# Patient Record
Sex: FEMALE | Race: WHITE | NOT HISPANIC OR LATINO | Employment: OTHER | URBAN - METROPOLITAN AREA
[De-identification: names, ages, dates, MRNs, and addresses within clinical notes are randomized per-mention and may not be internally consistent; named-entity substitution may affect disease eponyms.]

---

## 2020-03-25 ENCOUNTER — OFFICE VISIT (OUTPATIENT)
Dept: FAMILY MEDICINE | Facility: CLINIC | Age: 80
End: 2020-03-25
Payer: MEDICARE

## 2020-03-25 VITALS
HEART RATE: 77 BPM | SYSTOLIC BLOOD PRESSURE: 140 MMHG | TEMPERATURE: 97 F | BODY MASS INDEX: 19.71 KG/M2 | WEIGHT: 100.38 LBS | HEIGHT: 60 IN | DIASTOLIC BLOOD PRESSURE: 69 MMHG | OXYGEN SATURATION: 95 % | RESPIRATION RATE: 16 BRPM

## 2020-03-25 DIAGNOSIS — G47.00 INSOMNIA, UNSPECIFIED TYPE: Chronic | ICD-10-CM

## 2020-03-25 DIAGNOSIS — I10 HTN (HYPERTENSION), BENIGN: ICD-10-CM

## 2020-03-25 DIAGNOSIS — M19.90 ARTHRITIS: Chronic | ICD-10-CM

## 2020-03-25 DIAGNOSIS — E53.8 B12 DEFICIENCY: ICD-10-CM

## 2020-03-25 DIAGNOSIS — Z76.89 ENCOUNTER TO ESTABLISH CARE: Primary | ICD-10-CM

## 2020-03-25 DIAGNOSIS — M62.838 MUSCLE SPASM: Chronic | ICD-10-CM

## 2020-03-25 DIAGNOSIS — E78.5 HYPERLIPIDEMIA, UNSPECIFIED HYPERLIPIDEMIA TYPE: ICD-10-CM

## 2020-03-25 DIAGNOSIS — E03.9 HYPOTHYROIDISM, UNSPECIFIED TYPE: ICD-10-CM

## 2020-03-25 DIAGNOSIS — R52 PAIN MANAGEMENT: Chronic | ICD-10-CM

## 2020-03-25 DIAGNOSIS — E11.9 TYPE 2 DIABETES MELLITUS WITHOUT COMPLICATION, WITHOUT LONG-TERM CURRENT USE OF INSULIN: ICD-10-CM

## 2020-03-25 DIAGNOSIS — I34.1 MITRAL VALVE PROLAPSE: Chronic | ICD-10-CM

## 2020-03-25 DIAGNOSIS — E55.9 VITAMIN D DEFICIENCY: ICD-10-CM

## 2020-03-25 PROCEDURE — 3078F PR MOST RECENT DIASTOLIC BLOOD PRESSURE < 80 MM HG: ICD-10-PCS | Mod: CPTII,S$GLB,, | Performed by: FAMILY MEDICINE

## 2020-03-25 PROCEDURE — 99204 OFFICE O/P NEW MOD 45 MIN: CPT | Mod: S$GLB,,, | Performed by: FAMILY MEDICINE

## 2020-03-25 PROCEDURE — 1159F PR MEDICATION LIST DOCUMENTED IN MEDICAL RECORD: ICD-10-PCS | Mod: S$GLB,,, | Performed by: FAMILY MEDICINE

## 2020-03-25 PROCEDURE — 3077F SYST BP >= 140 MM HG: CPT | Mod: CPTII,S$GLB,, | Performed by: FAMILY MEDICINE

## 2020-03-25 PROCEDURE — 99204 PR OFFICE/OUTPT VISIT, NEW, LEVL IV, 45-59 MIN: ICD-10-PCS | Mod: S$GLB,,, | Performed by: FAMILY MEDICINE

## 2020-03-25 PROCEDURE — 3077F PR MOST RECENT SYSTOLIC BLOOD PRESSURE >= 140 MM HG: ICD-10-PCS | Mod: CPTII,S$GLB,, | Performed by: FAMILY MEDICINE

## 2020-03-25 PROCEDURE — 1159F MED LIST DOCD IN RCRD: CPT | Mod: S$GLB,,, | Performed by: FAMILY MEDICINE

## 2020-03-25 PROCEDURE — 3078F DIAST BP <80 MM HG: CPT | Mod: CPTII,S$GLB,, | Performed by: FAMILY MEDICINE

## 2020-03-25 RX ORDER — ATENOLOL 100 MG/1
1 TABLET ORAL DAILY
COMMUNITY
Start: 2020-01-28 | End: 2021-02-08 | Stop reason: SDUPTHER

## 2020-03-25 RX ORDER — AMITRIPTYLINE HYDROCHLORIDE 50 MG/1
1 TABLET, FILM COATED ORAL DAILY
COMMUNITY
Start: 2020-02-13 | End: 2020-03-25 | Stop reason: SDUPTHER

## 2020-03-25 RX ORDER — METHOTREXATE 2.5 MG/1
6 TABLET ORAL
COMMUNITY
Start: 2020-02-05 | End: 2022-12-20

## 2020-03-25 RX ORDER — GABAPENTIN 300 MG/1
300 CAPSULE ORAL 3 TIMES DAILY
Qty: 270 CAPSULE | Refills: 1 | Status: SHIPPED | OUTPATIENT
Start: 2020-03-25 | End: 2020-07-16 | Stop reason: SDUPTHER

## 2020-03-25 RX ORDER — GABAPENTIN 300 MG/1
1 CAPSULE ORAL 3 TIMES DAILY
COMMUNITY
Start: 2020-02-13 | End: 2020-03-25 | Stop reason: SDUPTHER

## 2020-03-25 RX ORDER — TIZANIDINE 4 MG/1
1 TABLET ORAL 2 TIMES DAILY
COMMUNITY
Start: 2020-03-12 | End: 2020-03-25 | Stop reason: SDUPTHER

## 2020-03-25 RX ORDER — TRAMADOL HYDROCHLORIDE 50 MG/1
2 TABLET ORAL DAILY
COMMUNITY
Start: 2020-02-13 | End: 2021-02-10

## 2020-03-25 RX ORDER — BUPRENORPHINE HYDROCHLORIDE 8 MG/1
1 TABLET SUBLINGUAL 2 TIMES DAILY
COMMUNITY
Start: 2020-02-13 | End: 2022-12-20

## 2020-03-25 RX ORDER — HYDROCODONE BITARTRATE AND ACETAMINOPHEN 7.5; 325 MG/1; MG/1
1 TABLET ORAL DAILY
COMMUNITY
Start: 2020-03-12 | End: 2021-02-10

## 2020-03-25 RX ORDER — TIZANIDINE 4 MG/1
4 TABLET ORAL 2 TIMES DAILY
Qty: 180 TABLET | Refills: 1 | Status: SHIPPED | OUTPATIENT
Start: 2020-03-25 | End: 2020-07-16 | Stop reason: SDUPTHER

## 2020-03-25 RX ORDER — AMITRIPTYLINE HYDROCHLORIDE 50 MG/1
100 TABLET, FILM COATED ORAL NIGHTLY PRN
Qty: 90 TABLET | Refills: 3 | Status: SHIPPED | OUTPATIENT
Start: 2020-03-25 | End: 2020-06-22 | Stop reason: SDUPTHER

## 2020-03-25 NOTE — PROGRESS NOTES
Subjective:       Patient ID: Meagan Salcedo is a 79 y.o. female.    Chief Complaint: Establish Care (pt is here to get established with a pcp.)    78 yo F here to get established. Previously seen by Dr Martins a long time ago. She has been seen by rheumatology and pain management. Pt has had 6 surgeries. pMHx of some arthritis that she sees Dr Cason for and takes methotrexate. Pt also has insomnia for which she takes amitriptylin and she really would like the 100 mg dosage. She also has neuropathy for which she takes gabapentin 300mg TiD. And she is on a muscle relaxant for muscle spasms that are caused by her surgeries.   Discussed the adverse effects of her medications and pt agrees that she will be very careful     Review of Systems   Constitutional: Negative for activity change, chills, fatigue, fever and unexpected weight change.   HENT: Negative for ear pain, rhinorrhea and trouble swallowing.    Eyes: Negative for pain.   Respiratory: Negative for cough, chest tightness, shortness of breath and wheezing.    Cardiovascular: Negative for chest pain and palpitations.   Gastrointestinal: Negative for abdominal distention, abdominal pain, constipation, diarrhea, nausea and vomiting.   Endocrine: Negative for cold intolerance and heat intolerance.   Genitourinary: Negative for dysuria, frequency and urgency.   Musculoskeletal: Positive for arthralgias. Negative for myalgias.   Skin: Negative for rash.   Neurological: Negative for dizziness, syncope, light-headedness and headaches.   Hematological: Does not bruise/bleed easily.   Psychiatric/Behavioral: Negative for agitation and confusion.       Objective:      Physical Exam   Constitutional: She appears well-developed.   HENT:   Right Ear: External ear normal.   Left Ear: External ear normal.   Mouth/Throat: Oropharynx is clear and moist.   Eyes: Conjunctivae and EOM are normal.   Neck: Normal range of motion.   Cardiovascular: Normal rate, regular rhythm  and intact distal pulses.   Pulmonary/Chest: Effort normal and breath sounds normal.   Abdominal: Soft.   Musculoskeletal: Normal range of motion.   Neurological: She is alert.   Skin: Skin is warm. Capillary refill takes less than 2 seconds.   Psychiatric: She has a normal mood and affect.   Nursing note and vitals reviewed.      Assessment:       1. Encounter to establish care    2. Pain management - 6 surgeries    3. Arthritis - on methotrexate    4. Mitral valve prolapse - Dr Shea    5. Insomnia, unspecified type Stable   6. Muscle spasm Stable   7. HTN (hypertension), benign    8. Hyperlipidemia, unspecified hyperlipidemia type    9. Hypothyroidism, unspecified type    10. Vitamin D deficiency    11. Type 2 diabetes mellitus without complication, without long-term current use of insulin    12. B12 deficiency        Plan:       PROBLEM LIST     Meagan was seen today for establish care.    Diagnoses and all orders for this visit:    Encounter to establish care    Pain management - 6 surgeries  Comments:  Dr URBAN Valencia  Orders:  -     tiZANidine (ZANAFLEX) 4 MG tablet; Take 1 tablet (4 mg total) by mouth 2 (two) times daily.  -     gabapentin (NEURONTIN) 300 MG capsule; Take 1 capsule (300 mg total) by mouth 3 (three) times daily.    Arthritis - on methotrexate  Comments:  dr Cason - as per pt it is osteoarthritis    Mitral valve prolapse - Dr Shea  Comments:  on atenolol    Insomnia, unspecified type  Comments:  on amitriptyline - refilled  Orders:  -     amitriptyline (ELAVIL) 50 MG tablet; Take 2 tablets (100 mg total) by mouth nightly as needed for Insomnia.    Muscle spasm  Comments:  controlled on tizanidine  Orders:  -     tiZANidine (ZANAFLEX) 4 MG tablet; Take 1 tablet (4 mg total) by mouth 2 (two) times daily.    HTN (hypertension), benign  -     CBC auto differential; Future  -     Comprehensive metabolic panel; Future  -     CBC auto differential  -     Comprehensive metabolic  panel    Hyperlipidemia, unspecified hyperlipidemia type  -     Lipid panel; Future  -     Lipid panel    Hypothyroidism, unspecified type  -     TSH; Future  -     TSH    Vitamin D deficiency  -     Vitamin D; Future  -     Vitamin D    Type 2 diabetes mellitus without complication, without long-term current use of insulin  -     Hemoglobin A1c; Future  -     Hemoglobin A1c    B12 deficiency  -     Vitamin B12; Future  -     Vitamin B12

## 2020-03-26 PROBLEM — G47.00 INSOMNIA: Chronic | Status: ACTIVE | Noted: 2020-03-26

## 2020-03-26 PROBLEM — M62.838 MUSCLE SPASM: Chronic | Status: ACTIVE | Noted: 2020-03-26

## 2020-04-01 LAB
ABS NRBC COUNT: 0 X 10 3/UL (ref 0–0.01)
ABSOLUTE BASOPHIL: 0.05 X 10 3/UL (ref 0–0.22)
ABSOLUTE EOSINOPHIL: 0.22 X 10 3/UL (ref 0.04–0.54)
ABSOLUTE IMMATURE GRAN: 0.01 X 10 3/UL (ref 0–0.04)
ABSOLUTE LYMPHOCYTE: 2.16 X 10 3/UL (ref 0.86–4.75)
ABSOLUTE MONOCYTE: 0.58 X 10 3/UL (ref 0.22–1.08)
ALBUMIN SERPL-MCNC: 4.1 G/DL (ref 3.5–5.2)
ALBUMIN/GLOB SERPL ELPH: 1.6 {RATIO} (ref 1–2.7)
ALP ISOS SERPL LEV INH-CCNC: 178 U/L (ref 35–105)
ALT (SGPT): 23 U/L (ref 0–33)
ANION GAP SERPL CALC-SCNC: 13 MMOL/L (ref 8–17)
AST SERPL-CCNC: 24 U/L (ref 0–32)
B12: 323 PG/ML (ref 232–1245)
BASOPHILS NFR BLD: 0.9 % (ref 0.2–1.2)
BILIRUBIN, TOTAL: <0.15 MG/DL (ref 0–1.2)
BUN/CREAT SERPL: 25.5 (ref 6–20)
CALCIUM SERPL-MCNC: 9 MG/DL (ref 8.6–10.2)
CARBON DIOXIDE, CO2: 26 MMOL/L (ref 22–29)
CHLORIDE: 102 MMOL/L (ref 98–107)
CHOLEST SERPL-MSCNC: 259 MG/DL (ref 100–200)
CREAT SERPL-MCNC: 0.6 MG/DL (ref 0.5–0.9)
EOSINOPHIL NFR BLD: 3.8 % (ref 0.7–7)
ESTIMATED AVERAGE GLUCOSE: 134 MG/DL
GFR ESTIMATION: 96.94
GLOBULIN: 2.6 G/DL (ref 1.5–4.5)
GLUCOSE: 120 MG/DL (ref 82–115)
HBA1C MFR BLD: 6.3 % (ref 4–6)
HCT VFR BLD AUTO: 38.6 % (ref 37–47)
HDLC SERPL-MCNC: 73 MG/DL
HGB BLD-MCNC: 12.3 G/DL (ref 12–16)
IMMATURE GRANULOCYTES: 0.2 % (ref 0–0.5)
LDL/HDL RATIO: 2.1 (ref 1–3)
LDLC SERPL CALC-MCNC: 156 MG/DL (ref 0–100)
LYMPHOCYTES NFR BLD: 37.3 % (ref 19.3–53.1)
MCH RBC QN AUTO: 28.8 PG (ref 27–32)
MCHC RBC AUTO-ENTMCNC: 31.9 G/DL (ref 32–36)
MCV RBC AUTO: 90.4 FL (ref 82–100)
MONOCYTES NFR BLD: 10 % (ref 4.7–12.5)
NEUTROPHILS ABSOLUTE COUNT: 2.77 X 10 3/UL (ref 2.15–7.56)
NEUTROPHILS NFR BLD: 47.8 %
NUCLEATED RED BLOOD CELLS: 0 /100 WBC (ref 0–0.2)
PLATELET # BLD AUTO: 415 X 10 3/UL (ref 135–400)
POTASSIUM: 4.2 MMOL/L (ref 3.5–5.1)
PROT SNV-MCNC: 6.7 G/DL (ref 6.4–8.3)
RBC # BLD AUTO: 4.27 X 10 6/UL (ref 4.2–5.4)
RDW-SD: 44.8 FL (ref 37–54)
SODIUM: 141 MMOL/L (ref 136–145)
TRIGL SERPL-MCNC: 150 MG/DL (ref 0–150)
TSH SERPL DL<=0.005 MIU/L-ACNC: 1.07 UIU/ML (ref 0.27–4.2)
UREA NITROGEN (BUN): 15.3 MG/DL (ref 8–23)
VITAMIN D (25OHD): 75.3 NG/ML
WBC # BLD: 5.79 X 10 3/UL (ref 4.3–10.8)

## 2020-04-14 ENCOUNTER — PATIENT MESSAGE (OUTPATIENT)
Dept: FAMILY MEDICINE | Facility: CLINIC | Age: 80
End: 2020-04-14

## 2020-06-22 DIAGNOSIS — G47.00 INSOMNIA, UNSPECIFIED TYPE: Chronic | ICD-10-CM

## 2020-06-22 DIAGNOSIS — M62.838 MUSCLE SPASM: Chronic | ICD-10-CM

## 2020-06-22 DIAGNOSIS — R52 PAIN MANAGEMENT: Chronic | ICD-10-CM

## 2020-06-22 NOTE — TELEPHONE ENCOUNTER
----- Message from Clair He sent at 6/22/2020 11:26 AM CDT -----  Pt want to receive a call to discuss medication she didn't know that she had to be seen in order to get a refill. She is completely out of medication an appt was schedule out for 07/16 pt need to know what should she do now. [phone]       Thanks   [phone]

## 2020-06-22 NOTE — TELEPHONE ENCOUNTER
She has pain management  (Dr Valencia) and I filled as a courtesy.... when will she see him again?

## 2020-06-22 NOTE — TELEPHONE ENCOUNTER
Pt has made a apt. For 7/16. Would you be able to fill these meds till her apt she is completely out?

## 2020-06-25 ENCOUNTER — TELEPHONE (OUTPATIENT)
Dept: INTERNAL MEDICINE | Facility: CLINIC | Age: 80
End: 2020-06-25

## 2020-06-25 NOTE — TELEPHONE ENCOUNTER
----- Message from Bridgette Contreras sent at 6/25/2020  4:15 PM CDT -----  Regarding: Patient Advice  Contact: patient  Would like to consult with nurse regarding her blood work results. Patient is stating when she viewed her results online it stated she had diabetes and she is needing to discuss the results. Please call back at [phone]    
----- Message from Bridgette Contreras sent at 6/25/2020  4:15 PM CDT -----  Regarding: Patient Advice  Contact: patient  Would like to consult with nurse regarding her blood work results. Patient is stating when she viewed her results online it stated she had diabetes and she is needing to discuss the results. Please call back at [phone]    
I called pt and let her know that she will have to come in to her apt to discuss her lab work results and she said ok.   
facial swelling

## 2020-07-01 RX ORDER — GABAPENTIN 300 MG/1
300 CAPSULE ORAL 3 TIMES DAILY
Qty: 90 CAPSULE | Refills: 0 | OUTPATIENT
Start: 2020-07-01

## 2020-07-01 RX ORDER — TIZANIDINE 4 MG/1
4 TABLET ORAL 2 TIMES DAILY
Qty: 60 TABLET | Refills: 0 | OUTPATIENT
Start: 2020-07-01

## 2020-07-01 RX ORDER — AMITRIPTYLINE HYDROCHLORIDE 50 MG/1
100 TABLET, FILM COATED ORAL NIGHTLY PRN
Qty: 180 TABLET | Refills: 1 | Status: SHIPPED | OUTPATIENT
Start: 2020-07-01 | End: 2021-02-08 | Stop reason: SDUPTHER

## 2020-07-16 ENCOUNTER — OFFICE VISIT (OUTPATIENT)
Dept: FAMILY MEDICINE | Facility: CLINIC | Age: 80
End: 2020-07-16
Payer: MEDICARE

## 2020-07-16 VITALS
HEIGHT: 60 IN | TEMPERATURE: 98 F | OXYGEN SATURATION: 94 % | SYSTOLIC BLOOD PRESSURE: 137 MMHG | BODY MASS INDEX: 20.94 KG/M2 | WEIGHT: 106.63 LBS | HEART RATE: 71 BPM | RESPIRATION RATE: 16 BRPM | DIASTOLIC BLOOD PRESSURE: 67 MMHG

## 2020-07-16 DIAGNOSIS — R52 PAIN MANAGEMENT: Chronic | ICD-10-CM

## 2020-07-16 DIAGNOSIS — M62.838 MUSCLE SPASM: Chronic | ICD-10-CM

## 2020-07-16 DIAGNOSIS — G47.00 INSOMNIA, UNSPECIFIED TYPE: Chronic | ICD-10-CM

## 2020-07-16 DIAGNOSIS — M19.90 ARTHRITIS: Primary | Chronic | ICD-10-CM

## 2020-07-16 DIAGNOSIS — E78.5 HYPERLIPIDEMIA, UNSPECIFIED HYPERLIPIDEMIA TYPE: Chronic | ICD-10-CM

## 2020-07-16 PROCEDURE — 3078F DIAST BP <80 MM HG: CPT | Mod: CPTII,S$GLB,, | Performed by: FAMILY MEDICINE

## 2020-07-16 PROCEDURE — 3075F PR MOST RECENT SYSTOLIC BLOOD PRESS GE 130-139MM HG: ICD-10-PCS | Mod: CPTII,S$GLB,, | Performed by: FAMILY MEDICINE

## 2020-07-16 PROCEDURE — 99214 PR OFFICE/OUTPT VISIT, EST, LEVL IV, 30-39 MIN: ICD-10-PCS | Mod: S$GLB,,, | Performed by: FAMILY MEDICINE

## 2020-07-16 PROCEDURE — 99214 OFFICE O/P EST MOD 30 MIN: CPT | Mod: S$GLB,,, | Performed by: FAMILY MEDICINE

## 2020-07-16 PROCEDURE — 1159F PR MEDICATION LIST DOCUMENTED IN MEDICAL RECORD: ICD-10-PCS | Mod: S$GLB,,, | Performed by: FAMILY MEDICINE

## 2020-07-16 PROCEDURE — 3075F SYST BP GE 130 - 139MM HG: CPT | Mod: CPTII,S$GLB,, | Performed by: FAMILY MEDICINE

## 2020-07-16 PROCEDURE — 1159F MED LIST DOCD IN RCRD: CPT | Mod: S$GLB,,, | Performed by: FAMILY MEDICINE

## 2020-07-16 PROCEDURE — 3078F PR MOST RECENT DIASTOLIC BLOOD PRESSURE < 80 MM HG: ICD-10-PCS | Mod: CPTII,S$GLB,, | Performed by: FAMILY MEDICINE

## 2020-07-16 RX ORDER — GABAPENTIN 300 MG/1
300 CAPSULE ORAL 3 TIMES DAILY
Qty: 270 CAPSULE | Refills: 1 | Status: SHIPPED | OUTPATIENT
Start: 2020-07-16 | End: 2021-02-08 | Stop reason: SDUPTHER

## 2020-07-16 RX ORDER — TIZANIDINE 4 MG/1
4 TABLET ORAL 2 TIMES DAILY
Qty: 180 TABLET | Refills: 1 | Status: SHIPPED | OUTPATIENT
Start: 2020-07-16 | End: 2021-02-08 | Stop reason: SDUPTHER

## 2020-07-16 NOTE — PROGRESS NOTES
Subjective:       Patient ID: Meagan Salcedo is a 79 y.o. female.    Chief Complaint: Follow-up (pt is here for a f/u and discuss her lab work. Pt also states that she may need refills.)    80 yo F here for discussion of lab results.   A1C=6.3% and pt is technically a prediabetic. Discussed that she does not be started on medication and pt is okay with it.   HLD: pt does not want any medication  Neuropathy: pt is on gabapentin for which she needs refills. She is also in pain management but they do not fill for gabapentin. Pt is compliant. No AEs  Insomnia: pt is on amitriptyline and she just got 6 months refilled for it this month. She does not need any refills right now. Her insomnia is controlled on it.   Muscle spasms: pt takes tizanidine for it. She is compliant and she has no AEs to it. She needs refills.     Review of Systems   Constitutional: Negative for activity change, chills, fatigue, fever and unexpected weight change.   HENT: Negative for ear pain, rhinorrhea and trouble swallowing.    Eyes: Negative for pain.   Respiratory: Negative for cough, chest tightness, shortness of breath and wheezing.    Cardiovascular: Negative for chest pain and palpitations.   Gastrointestinal: Negative for abdominal distention, abdominal pain, constipation, diarrhea, nausea and vomiting.   Endocrine: Negative for cold intolerance and heat intolerance.   Genitourinary: Negative for dysuria, frequency and urgency.   Musculoskeletal: Positive for arthralgias, back pain and neck pain. Negative for myalgias.   Skin: Negative for rash.   Neurological: Negative for dizziness, syncope, light-headedness and headaches.   Hematological: Does not bruise/bleed easily.   Psychiatric/Behavioral: Negative for agitation and confusion.       Objective:      Physical Exam  Vitals signs and nursing note reviewed.   Constitutional:       Appearance: Normal appearance. She is well-developed.   HENT:      Head: Normocephalic and  atraumatic.      Right Ear: External ear normal.      Left Ear: External ear normal.      Nose: Nose normal.   Eyes:      Extraocular Movements: Extraocular movements intact.      Conjunctiva/sclera: Conjunctivae normal.   Neck:      Musculoskeletal: Normal range of motion.   Cardiovascular:      Rate and Rhythm: Normal rate and regular rhythm.      Pulses: Normal pulses.   Pulmonary:      Effort: Pulmonary effort is normal.      Breath sounds: Normal breath sounds.   Abdominal:      Palpations: Abdomen is soft.      Tenderness: There is no abdominal tenderness. There is no guarding.   Musculoskeletal: Normal range of motion.   Skin:     General: Skin is warm.      Capillary Refill: Capillary refill takes less than 2 seconds.   Neurological:      General: No focal deficit present.      Mental Status: She is alert. Mental status is at baseline.   Psychiatric:         Mood and Affect: Mood normal.         Assessment:       1. Arthritis - on methotrexate - Dr Cason Continue current regimen   2. Insomnia, unspecified type    3. Pain management - 6 surgeries    4. Muscle spasm Stable   5. Hyperlipidemia, unspecified hyperlipidemia type        Plan:       PROBLEM LIST     Meagan was seen today for follow-up.    Diagnoses and all orders for this visit:    Arthritis - on methotrexate - Dr Cason  Comments:  Dr Cason    Insomnia, unspecified type    Pain management - 6 surgeries  Comments:  Dr URBAN Valencia  Orders:  -     tiZANidine (ZANAFLEX) 4 MG tablet; Take 1 tablet (4 mg total) by mouth 2 (two) times daily.  -     gabapentin (NEURONTIN) 300 MG capsule; Take 1 capsule (300 mg total) by mouth 3 (three) times daily.    Muscle spasm  Comments:  controlled on tizanidine  Orders:  -     tiZANidine (ZANAFLEX) 4 MG tablet; Take 1 tablet (4 mg total) by mouth 2 (two) times daily.    Hyperlipidemia, unspecified hyperlipidemia type  Comments:  pt does not want any medication

## 2021-02-08 ENCOUNTER — OFFICE VISIT (OUTPATIENT)
Dept: FAMILY MEDICINE | Facility: CLINIC | Age: 81
End: 2021-02-08
Payer: MEDICARE

## 2021-02-08 VITALS
TEMPERATURE: 97 F | SYSTOLIC BLOOD PRESSURE: 172 MMHG | HEIGHT: 60 IN | BODY MASS INDEX: 21.99 KG/M2 | WEIGHT: 112 LBS | DIASTOLIC BLOOD PRESSURE: 97 MMHG | RESPIRATION RATE: 16 BRPM | OXYGEN SATURATION: 96 % | HEART RATE: 76 BPM

## 2021-02-08 DIAGNOSIS — G47.00 INSOMNIA, UNSPECIFIED TYPE: Chronic | ICD-10-CM

## 2021-02-08 DIAGNOSIS — M85.80 OSTEOPENIA, UNSPECIFIED LOCATION: ICD-10-CM

## 2021-02-08 DIAGNOSIS — I05.9 MITRAL VALVE DISORDER: Primary | ICD-10-CM

## 2021-02-08 DIAGNOSIS — M19.90 OSTEOARTHRITIS, UNSPECIFIED OSTEOARTHRITIS TYPE, UNSPECIFIED SITE: ICD-10-CM

## 2021-02-08 DIAGNOSIS — M62.838 MUSCLE SPASM: Chronic | ICD-10-CM

## 2021-02-08 DIAGNOSIS — R73.01 ABNORMAL FASTING GLUCOSE: ICD-10-CM

## 2021-02-08 DIAGNOSIS — R53.83 FATIGUE, UNSPECIFIED TYPE: ICD-10-CM

## 2021-02-08 DIAGNOSIS — R79.9 ABNORMAL FINDING OF BLOOD CHEMISTRY, UNSPECIFIED: ICD-10-CM

## 2021-02-08 DIAGNOSIS — R52 PAIN MANAGEMENT: Chronic | ICD-10-CM

## 2021-02-08 DIAGNOSIS — G47.00 INSOMNIA, UNSPECIFIED TYPE: ICD-10-CM

## 2021-02-08 DIAGNOSIS — R03.0 ELEVATED BLOOD-PRESSURE READING, WITHOUT DIAGNOSIS OF HYPERTENSION: ICD-10-CM

## 2021-02-08 DIAGNOSIS — E78.5 HYPERLIPIDEMIA, UNSPECIFIED HYPERLIPIDEMIA TYPE: ICD-10-CM

## 2021-02-08 PROCEDURE — 3077F SYST BP >= 140 MM HG: CPT | Mod: CPTII,S$GLB,, | Performed by: INTERNAL MEDICINE

## 2021-02-08 PROCEDURE — 1159F MED LIST DOCD IN RCRD: CPT | Mod: S$GLB,,, | Performed by: INTERNAL MEDICINE

## 2021-02-08 PROCEDURE — 99214 PR OFFICE/OUTPT VISIT, EST, LEVL IV, 30-39 MIN: ICD-10-PCS | Mod: S$GLB,,, | Performed by: INTERNAL MEDICINE

## 2021-02-08 PROCEDURE — 3080F PR MOST RECENT DIASTOLIC BLOOD PRESSURE >= 90 MM HG: ICD-10-PCS | Mod: CPTII,S$GLB,, | Performed by: INTERNAL MEDICINE

## 2021-02-08 PROCEDURE — 3080F DIAST BP >= 90 MM HG: CPT | Mod: CPTII,S$GLB,, | Performed by: INTERNAL MEDICINE

## 2021-02-08 PROCEDURE — 3077F PR MOST RECENT SYSTOLIC BLOOD PRESSURE >= 140 MM HG: ICD-10-PCS | Mod: CPTII,S$GLB,, | Performed by: INTERNAL MEDICINE

## 2021-02-08 PROCEDURE — 1159F PR MEDICATION LIST DOCUMENTED IN MEDICAL RECORD: ICD-10-PCS | Mod: S$GLB,,, | Performed by: INTERNAL MEDICINE

## 2021-02-08 PROCEDURE — 99214 OFFICE O/P EST MOD 30 MIN: CPT | Mod: S$GLB,,, | Performed by: INTERNAL MEDICINE

## 2021-02-08 RX ORDER — ATENOLOL 100 MG/1
100 TABLET ORAL DAILY
Qty: 90 TABLET | Refills: 3 | Status: SHIPPED | OUTPATIENT
Start: 2021-02-08 | End: 2021-08-02 | Stop reason: SDUPTHER

## 2021-02-08 RX ORDER — TIZANIDINE 4 MG/1
4 TABLET ORAL 2 TIMES DAILY
Qty: 180 TABLET | Refills: 1 | Status: SHIPPED | OUTPATIENT
Start: 2021-02-08 | End: 2021-08-02

## 2021-02-08 RX ORDER — GABAPENTIN 300 MG/1
300 CAPSULE ORAL 3 TIMES DAILY
Qty: 270 CAPSULE | Refills: 1 | Status: SHIPPED | OUTPATIENT
Start: 2021-02-08 | End: 2021-08-02 | Stop reason: SDUPTHER

## 2021-02-08 RX ORDER — AMITRIPTYLINE HYDROCHLORIDE 50 MG/1
TABLET, FILM COATED ORAL
Qty: 180 TABLET | Refills: 1 | Status: SHIPPED | OUTPATIENT
Start: 2021-02-08 | End: 2021-08-02

## 2022-01-10 DIAGNOSIS — M62.838 MUSCLE SPASM: Chronic | ICD-10-CM

## 2022-01-10 DIAGNOSIS — G47.00 INSOMNIA, UNSPECIFIED TYPE: Chronic | ICD-10-CM

## 2022-01-10 DIAGNOSIS — R52 PAIN MANAGEMENT: Chronic | ICD-10-CM

## 2022-01-10 RX ORDER — ATENOLOL 100 MG/1
100 TABLET ORAL DAILY
Qty: 90 TABLET | Refills: 3 | Status: SHIPPED | OUTPATIENT
Start: 2022-01-10 | End: 2022-12-20 | Stop reason: SDUPTHER

## 2022-01-10 RX ORDER — TIZANIDINE 4 MG/1
4 TABLET ORAL 2 TIMES DAILY
Qty: 180 TABLET | Refills: 1 | Status: SHIPPED | OUTPATIENT
Start: 2022-01-10 | End: 2022-09-19

## 2022-01-10 RX ORDER — AMITRIPTYLINE HYDROCHLORIDE 50 MG/1
TABLET, FILM COATED ORAL
Qty: 180 TABLET | Refills: 3 | Status: SHIPPED | OUTPATIENT
Start: 2022-01-10 | End: 2022-12-20 | Stop reason: SDUPTHER

## 2022-01-10 NOTE — TELEPHONE ENCOUNTER
----- Message from Crista Adin sent at 1/10/2022 11:13 AM CST -----  Regarding: pt  Type:  RX Refill Request    Who Called: pt  Refill or New Rx:refill  RX Name and Strength:amitriptyline (ELAVIL) 50 MG tablet  How is the patient currently taking it? (ex. 1XDay):1xday  Is this a 30 day or 90 day RX:90  Preferred Pharmacy with phone number:  [facility] #[id number] - [place], [place] - [address] [address]  Phone: [phone] Fax: [phone]     Local or Mail Order:local  Ordering Provider:[place]  Would the patient rather a call back or a response via [facility]? Call back  Best Call Back Number:[phone]    Additional Information:    atenoloL (TENORMIN) 100 MG tablet  tiZANidine (ZANAFLEX) 4 MG tablet

## 2022-12-15 DIAGNOSIS — M62.838 MUSCLE SPASM: Chronic | ICD-10-CM

## 2022-12-15 DIAGNOSIS — R52 PAIN MANAGEMENT: Chronic | ICD-10-CM

## 2022-12-15 DIAGNOSIS — G47.00 INSOMNIA, UNSPECIFIED TYPE: Chronic | ICD-10-CM

## 2022-12-15 RX ORDER — AMITRIPTYLINE HYDROCHLORIDE 50 MG/1
TABLET, FILM COATED ORAL
Qty: 180 TABLET | Refills: 3 | OUTPATIENT
Start: 2022-12-15

## 2022-12-15 RX ORDER — TIZANIDINE 4 MG/1
4 TABLET ORAL 2 TIMES DAILY
Qty: 180 TABLET | Refills: 1 | OUTPATIENT
Start: 2022-12-15

## 2022-12-15 RX ORDER — ATENOLOL 100 MG/1
100 TABLET ORAL DAILY
Qty: 90 TABLET | Refills: 3 | OUTPATIENT
Start: 2022-12-15

## 2022-12-15 NOTE — TELEPHONE ENCOUNTER
----- Message from Dylan Chavez sent at 12/15/2022 11:24 AM CST -----  Contact: pt  amitriptyline (ELAVIL) 50 MG tablet 180 tablet   atenoloL (TENORMIN) 100 MG tablet  tiZANidine (ZANAFLEX) 4 MG tablet       Pt needs refill on following rxs  .  [facility] #[id number] [address] [place]  [address] [place] [id number]  Phone: [phone] Fax: [phone]        [phone] cb

## 2022-12-19 ENCOUNTER — PATIENT MESSAGE (OUTPATIENT)
Dept: FAMILY MEDICINE | Facility: CLINIC | Age: 82
End: 2022-12-19
Payer: MEDICARE

## 2022-12-19 ENCOUNTER — TELEPHONE (OUTPATIENT)
Dept: FAMILY MEDICINE | Facility: CLINIC | Age: 82
End: 2022-12-19
Payer: MEDICARE

## 2022-12-19 NOTE — TELEPHONE ENCOUNTER
Patient notified via [facility] that Dr. Peter has refused her medications because she's way past due for her follow up.

## 2022-12-19 NOTE — TELEPHONE ENCOUNTER
----- Message from Sallie Austin sent at 12/19/2022 12:18 PM CST -----  Contact: Patient  Patient called to consult with nurse or staff to check on status of refill requests. She states she requested the amitriptyline (ELAVIL) 50 MG,  tiZANidine (ZANAFLEX) 4 MG tablet, and atenoloL (TENORMIN) 100 MG tablet. Patient would like these medications sent to   [facility] #[id number] [address] AT [address] [address]  Phone: [phone] Fax: [phone]  Patient would like a call back and can be reached at [phone]. Thanks/MR

## 2022-12-20 ENCOUNTER — TELEPHONE (OUTPATIENT)
Dept: FAMILY MEDICINE | Facility: CLINIC | Age: 82
End: 2022-12-20

## 2022-12-20 ENCOUNTER — OFFICE VISIT (OUTPATIENT)
Dept: FAMILY MEDICINE | Facility: CLINIC | Age: 82
End: 2022-12-20
Payer: MEDICARE

## 2022-12-20 VITALS
WEIGHT: 103.5 LBS | OXYGEN SATURATION: 95 % | HEIGHT: 60 IN | HEART RATE: 69 BPM | DIASTOLIC BLOOD PRESSURE: 74 MMHG | SYSTOLIC BLOOD PRESSURE: 138 MMHG | BODY MASS INDEX: 20.32 KG/M2 | TEMPERATURE: 98 F

## 2022-12-20 DIAGNOSIS — M62.838 MUSCLE SPASM: Chronic | ICD-10-CM

## 2022-12-20 DIAGNOSIS — I34.1 MITRAL VALVE PROLAPSE: Primary | Chronic | ICD-10-CM

## 2022-12-20 DIAGNOSIS — R73.03 PREDIABETES: ICD-10-CM

## 2022-12-20 DIAGNOSIS — E78.5 HYPERLIPIDEMIA, UNSPECIFIED HYPERLIPIDEMIA TYPE: ICD-10-CM

## 2022-12-20 DIAGNOSIS — G89.29 OTHER CHRONIC PAIN: ICD-10-CM

## 2022-12-20 DIAGNOSIS — G47.00 INSOMNIA, UNSPECIFIED TYPE: Chronic | ICD-10-CM

## 2022-12-20 DIAGNOSIS — R52 PAIN MANAGEMENT: Chronic | ICD-10-CM

## 2022-12-20 PROCEDURE — 3075F SYST BP GE 130 - 139MM HG: CPT | Mod: CPTII,S$GLB,, | Performed by: INTERNAL MEDICINE

## 2022-12-20 PROCEDURE — 1160F PR REVIEW ALL MEDS BY PRESCRIBER/CLIN PHARMACIST DOCUMENTED: ICD-10-PCS | Mod: CPTII,S$GLB,, | Performed by: INTERNAL MEDICINE

## 2022-12-20 PROCEDURE — 3078F DIAST BP <80 MM HG: CPT | Mod: CPTII,S$GLB,, | Performed by: INTERNAL MEDICINE

## 2022-12-20 PROCEDURE — 3078F PR MOST RECENT DIASTOLIC BLOOD PRESSURE < 80 MM HG: ICD-10-PCS | Mod: CPTII,S$GLB,, | Performed by: INTERNAL MEDICINE

## 2022-12-20 PROCEDURE — 1159F MED LIST DOCD IN RCRD: CPT | Mod: CPTII,S$GLB,, | Performed by: INTERNAL MEDICINE

## 2022-12-20 PROCEDURE — 1159F PR MEDICATION LIST DOCUMENTED IN MEDICAL RECORD: ICD-10-PCS | Mod: CPTII,S$GLB,, | Performed by: INTERNAL MEDICINE

## 2022-12-20 PROCEDURE — 99214 PR OFFICE/OUTPT VISIT, EST, LEVL IV, 30-39 MIN: ICD-10-PCS | Mod: S$GLB,,, | Performed by: INTERNAL MEDICINE

## 2022-12-20 PROCEDURE — 3075F PR MOST RECENT SYSTOLIC BLOOD PRESS GE 130-139MM HG: ICD-10-PCS | Mod: CPTII,S$GLB,, | Performed by: INTERNAL MEDICINE

## 2022-12-20 PROCEDURE — 1160F RVW MEDS BY RX/DR IN RCRD: CPT | Mod: CPTII,S$GLB,, | Performed by: INTERNAL MEDICINE

## 2022-12-20 PROCEDURE — 99214 OFFICE O/P EST MOD 30 MIN: CPT | Mod: S$GLB,,, | Performed by: INTERNAL MEDICINE

## 2022-12-20 RX ORDER — TIZANIDINE 4 MG/1
4 TABLET ORAL 2 TIMES DAILY
Qty: 180 TABLET | Refills: 1 | Status: SHIPPED | OUTPATIENT
Start: 2022-12-20

## 2022-12-20 RX ORDER — ATENOLOL 100 MG/1
100 TABLET ORAL DAILY
Qty: 90 TABLET | Refills: 3 | Status: SHIPPED | OUTPATIENT
Start: 2022-12-20

## 2022-12-20 RX ORDER — AMITRIPTYLINE HYDROCHLORIDE 50 MG/1
TABLET, FILM COATED ORAL
Qty: 180 TABLET | Refills: 3 | Status: SHIPPED | OUTPATIENT
Start: 2022-12-20

## 2022-12-20 RX ORDER — BUPRENORPHINE HYDROCHLORIDE 8 MG/1
8 TABLET SUBLINGUAL DAILY
COMMUNITY

## 2022-12-20 RX ORDER — AMOXICILLIN 500 MG
1 CAPSULE ORAL DAILY
COMMUNITY

## 2022-12-20 NOTE — TELEPHONE ENCOUNTER
----- Message from Ambreen Gu sent at 12/20/2022  1:09 PM CST -----  Regarding: Refill  Contact: patient  Type:  RX Refill Request    Who Called: Meagan   Refill or New Rx: refill   RX Name and Strength: mitriptyline (ELAVIL) 50 MG tablet, tiZANidine (ZANAFLEX) 4 MG tablet, atenoloL (TENORMIN) 100 MG tablet  How is the patient currently taking it? (ex. 1XDay):daily   Is this a 30 day or 90 day RX: 90  Preferred Pharmacy with phone number:    [facility] #[id number] [place], [place] - [address]  [address] [place] [id number]  Phone: [phone] Fax: [phone]      Local or Mail Order local   Ordering Provider: Dr Peter     would the patient rather a call back or a response via [facility]?  Call back  Best Call Back Number: [phone] (home)     Additional Information: The caller indicated that she is out of her medication and would like to have then submitted to the pharmacy today    ThanksSARAH         Yes

## 2022-12-20 NOTE — PROGRESS NOTES
Subjective:       Patient ID: Meagan Salcedo is a 82 y.o. female.    Chief Complaint: Follow-up      HPI: Meagan comes in today for follow-up.  It has been almost 2 years since I have seen her.  She has chronic pain in her neck and spine.  She said she is had multiple surgeries.  She does see pain management and is on Buprenorphine.  She is had 3 lower back surgeries 2 cervical surgeries in 1 shoulder surgery.  She says that she was diagnosed with osteoarthritis in the past.  She was on methotrexate years ago but she is not been on that some time.  She says she prefers natural remedies when she is able.  She did have a colonoscopy in the past 2 times that were normal.  She is willing to do the Cologuard.  She did have a mammogram recently and it was normal.  I have recommended the flu vaccine, pneumonia, Shingrix, COVID.  She says she is not 1 to wants to get vaccines.  She says years ago when she would get flu shot she would get the flu.  She currently denies any chest pains or shortness of breath.  She has no history of heart troubles that she is aware of.       Past Medical History: History reviewed. No pertinent past medical history.    Past Surgical Historical:   Past Surgical History:   Procedure Laterality Date    BACK SURGERY      rods in back    MITRAL VALVE SURGERY  1984    NECK SURGERY      SHOULDER SURGERY          Vitals: /74   Pulse 69   Temp 97.9 °F (36.6 °C)   Ht 5' (1.524 m)   Wt 46.9 kg (103 lb 8 oz)   SpO2 95%   BMI 20.21 kg/m²      Medications:   Medication List with Changes/Refills   Current Medications    BUPRENORPHINE HCL (SUBUTEX) 8 MG SUBL    Place 8 mg under the tongue once daily.    CALCIUM CARBONATE/VITAMIN D3 (VITAMIN D-3 ORAL)    Take 2 capsules by mouth once daily.    OMEGA-3 FATTY ACIDS/FISH OIL (FISH OIL-OMEGA-3 FATTY ACIDS) 300-1,000 MG CAPSULE    Take 1 capsule by mouth once daily.   Changed and/or Refilled Medications    Modified Medication Previous Medication     AMITRIPTYLINE (ELAVIL) 50 MG TABLET amitriptyline (ELAVIL) 50 MG tablet       TAKE 2 TABLETS BY MOUTH AT BEDTIME AS NEEDED FOR SLEEP    TAKE 2 TABLETS BY MOUTH AT BEDTIME AS NEEDED FOR SLEEP    ATENOLOL (TENORMIN) 100 MG TABLET atenoloL (TENORMIN) 100 MG tablet       Take 1 tablet (100 mg total) by mouth once daily.    Take 1 tablet (100 mg total) by mouth once daily.    TIZANIDINE (ZANAFLEX) 4 MG TABLET tiZANidine (ZANAFLEX) 4 MG tablet       Take 1 tablet (4 mg total) by mouth 2 (two) times daily.    TAKE 1 TABLET(4 MG) BY MOUTH TWICE DAILY   Discontinued Medications    BUPRENORPHINE HCL (SUBUTEX) 8 MG SUBL    Place 1 tablet under the tongue 2 (two) times daily.    GABAPENTIN (NEURONTIN) 300 MG CAPSULE    Take 1 capsule (300 mg total) by mouth 3 (three) times daily.    METHOTREXATE 2.5 MG TAB    Take 6 tablets by mouth.        Past Social History:   Social History     Socioeconomic History    Marital status:    Tobacco Use    Smoking status: Former     Types: Cigarettes    Smokeless tobacco: Never   Substance and Sexual Activity    Alcohol use: Never       Allergies:   Review of patient's allergies indicates:   Allergen Reactions    Codeine      Headaches          Family History:   Family History   Problem Relation Age of Onset    Heart disease Mother     Diabetes Father         Review of Systems:  Review of Systems   Respiratory:  Negative for shortness of breath and wheezing.    Cardiovascular:  Negative for chest pain and palpitations.   Gastrointestinal:  Negative for abdominal pain, change in bowel habit and change in bowel habit.   Musculoskeletal:  Negative for gait problem.   Neurological:  Negative for dizziness and syncope.   Psychiatric/Behavioral:  Negative for suicidal ideas.       Physical Exam:  Physical Exam  Constitutional:       Appearance: Normal appearance.   Cardiovascular:      Rate and Rhythm: Normal rate and regular rhythm.   Pulmonary:      Effort: Pulmonary effort is normal.       Breath sounds: Normal breath sounds.   Abdominal:      General: Abdomen is flat.      Palpations: Abdomen is soft.   Skin:     General: Skin is warm and dry.   Neurological:      General: No focal deficit present.      Mental Status: She is alert and oriented to person, place, and time.   Psychiatric:         Mood and Affect: Mood normal.        Labs:  No visits with results within 6 Month(s) from this visit.   Latest known visit with results is:   Office Visit on 03/25/2020   Component Date Value Ref Range Status    WBC 04/01/2020 5.79  4.3 - 10.8 X 10 3/ul Final    RBC 04/01/2020 4.27  4.2 - 5.4 X 10 6/ul Final    RDW-SD 04/01/2020 44.8  37 - 54 fl Final    Hemoglobin 04/01/2020 12.3  12 - 16 g/dL Final    Hematocrit 04/01/2020 38.6  37 - 47 % Final    MCV 04/01/2020 90.4  82 - 100 fl Final    MCH 04/01/2020 28.8  27 - 32 pg Final    MCHC 04/01/2020 31.9 (L)  32 - 36 g/dL Final    Platelets 04/01/2020 415 (H)  135 - 400 X 10 3/ul Final    Neutrophils 04/01/2020 47.8  % Final    Lymphocytes 04/01/2020 37.3  19.3 - 53.1 % Final    Monocytes 04/01/2020 10.0  4.7 - 12.5 % Final    Eosinophils 04/01/2020 3.8  0.7 - 7.0 % Final    Basophils 04/01/2020 0.9  0.2 - 1.2 % Final    Neutrophils, Abs 04/01/2020 2.77  2.15 - 7.56 X 10 3/ul Final    Lymphocytes Absolute 04/01/2020 2.16  0.86 - 4.75 X 10 3/ul Final    Monocytes Absolute 04/01/2020 0.58  0.22 - 1.08 X 10 3/ul Final    Eosinophils Absolute 04/01/2020 0.22  0.04 - 0.54 X 10 3/ul Final    Basophils Absolute 04/01/2020 0.05  0.00 - 0.22 X 10 3/ul Final    Immature Granulocytes Absolute 04/01/2020 0.01  0 - 0.04 X 10 3/ul Final    Immature Granulocytes 04/01/2020 0.2  0 - 0.5 % Final    nRBC# 04/01/2020 0.0  0 - 0.2 /100 WBC Final    nRBC Count Absolute 04/01/2020 0.000  0 - 0.012 x 10 3/ul Final    Glucose 04/01/2020 120 (H)  82 - 115 mg/dL Final    BUN 04/01/2020 15.3  8 - 23 mg/dL Final    Creatinine 04/01/2020 0.60  0.50 - 0.90 mg/dL Final    AST 04/01/2020 24  0  - 32 U/L Final    ALT (SGPT) 04/01/2020 23  0 - 33 U/L Final    Alkaline Phosphatase 04/01/2020 178 (H)  35 - 105 U/L Final    Calcium 04/01/2020 9.0  8.6 - 10.2 mg/dL Final    Protein, Total 04/01/2020 6.7  6.4 - 8.3 g/dL Final    Albumin 04/01/2020 4.1  3.5 - 5.2 g/dL Final    BILIRUBIN, TOTAL 04/01/2020 <0.15  0.00 - 1.20 mg/dL Final    Sodium 04/01/2020 141  136 - 145 mmol/L Final    Potassium 04/01/2020 4.2  3.5 - 5.1 mmol/L Final    Chloride 04/01/2020 102  98 - 107 mmol/L Final    CO2 04/01/2020 26  22 - 29 mmol/L Final    Globulin 04/01/2020 2.6  1.5 - 4.5 g/dL Final    Albumin/Globulin Ratio 04/01/2020 1.6  1.0 - 2.7 Final    BUN/Creatinine Ratio 04/01/2020 25.5 (H)  6 - 20 Final    GFR ESTIMATION 04/01/2020 96.94  >60.00 Final    Anion Gap 04/01/2020 13.0  8.0 - 17.0 mmol/L Final    Cholesterol 04/01/2020 259 (H)  100 - 200 mg/dL Final    Triglycerides 04/01/2020 150  0 - 150 mg/dL Final    HDL 04/01/2020 73  >60 mg/dL Final    LDL Cholesterol 04/01/2020 156.0 (H)  0 - 100 mg/dL Final    LDL/HDL Ratio 04/01/2020 2.1  1 - 3 Final    VITAMIN D (25OHD) 04/01/2020 75.3  >30 ng/mL Final    Hemoglobin A1C 04/01/2020 6.3 (H)  4.0 - 6.0 % Final    EST AVERAGE GLUCOSE 04/01/2020 134 (H)  NORMAL MG/DL Final    TSH 04/01/2020 1.07  0.27 - 4.20 uIU/mL Final    B12 04/01/2020 323  232 - 1,245 pg/mL Final        Assessment/Plan:       Problem List Items Addressed This Visit          Neuro    Other chronic pain    Overview     She is in pain management.            Cardiac/Vascular    Mitral valve prolapse - Dr Shea - Primary (Chronic)    Overview     on atenolo         Hyperlipidemia       Endocrine    Prediabetes       Orthopedic    Muscle spasm (Chronic)    Overview     controlled on tizanidine         Relevant Medications    tiZANidine (ZANAFLEX) 4 MG tablet       Other    Insomnia (Chronic)    Overview     on amitriptyline - refilled         Relevant Medications    amitriptyline (ELAVIL) 50 MG tablet    Pain  management    Relevant Medications    tiZANidine (ZANAFLEX) 4 MG tablet               Follow up in about 6 months (around 6/20/2023).     Glynn Peter

## 2023-01-01 ENCOUNTER — PATIENT MESSAGE (OUTPATIENT)
Dept: FAMILY MEDICINE | Facility: CLINIC | Age: 83
End: 2023-01-01
Payer: MEDICARE